# Patient Record
Sex: MALE
[De-identification: names, ages, dates, MRNs, and addresses within clinical notes are randomized per-mention and may not be internally consistent; named-entity substitution may affect disease eponyms.]

---

## 2024-01-24 ENCOUNTER — APPOINTMENT (OUTPATIENT)
Dept: PAIN MANAGEMENT | Facility: CLINIC | Age: 24
End: 2024-01-24
Payer: COMMERCIAL

## 2024-01-24 VITALS — BODY MASS INDEX: 22.22 KG/M2 | WEIGHT: 150 LBS | HEIGHT: 69 IN

## 2024-01-24 PROBLEM — Z00.00 ENCOUNTER FOR PREVENTIVE HEALTH EXAMINATION: Status: ACTIVE | Noted: 2024-01-24

## 2024-01-24 PROCEDURE — 99202 OFFICE O/P NEW SF 15 MIN: CPT

## 2024-01-24 NOTE — REASON FOR VISIT
Recommended OBSERVATION and continued MONITORING for progression. [Initial Visit] : an initial pain management visit

## 2024-01-24 NOTE — ASSESSMENT
[FreeTextEntry1] : History This 24-year-old presents with longstanding left lower back pain.  Patient recently moved up to this area.  He states that he carries a diagnosis of spinal thesis of the lumbar spine and has been dealing with the lower back pain since he was a teenager.  The patient denies any bowel or bladder incontinence any progressive weakness but states that the pain at times becomes problematic.  Patient exercises on a routine basis but states that things have gotten slightly worse recently.  His last evaluation of this issue was approximately 4 years ago with imaging studies and he does not see any providers routinely for this disease state.  The patient has maintained an exercise program that was developed for him by his last provider and on a routine basis is to continue to do these exercises. Objective There are no recent imaging studies lumbar spine.  Patient is able to get to a standing position with minimal difficulty.  He has decreased range of motion of his lumbar spine but normal motion of his lower extremities bilaterally.  Motor and sensory exams appear grossly intact. Assessment Lumbar radiculopathy Plan Patient will continue with the exercise regimen that he has been given.  Will obtain an imaging study of the lumbar spine to determine if there has been further progression of the disease.  Patient will return to the office to discuss results once they have been obtained.

## 2024-01-24 NOTE — HISTORY OF PRESENT ILLNESS
[FreeTextEntry1] : THE PATIENT IS 24 year OLD RT HAND DOMINANT male  WHO PRESENT TODAY IN OFFICE WITH LSPINE RADATING BILATERAL, WOULD LIKE TO DISS PLAN OF CARE OF PAIN.      DATE OF INJURY/ONSET: 2 YEARS THAT PROGRESSING        PAIN: AT REST:_2-3/10       WTIH ACTIVITY: 5-6/10     MECHANISM OF INJURY: NKWN INJ    QUALITY OF SYMPTOMS:  BURNING, PRESSURE      IMPROVES WITH:  BENDING FOWARD, SITTING      WORSE WITH:  STANDING-PRO LONG AND SHORT TIME, HYPEREXTENDING      PRIOR TREATMENT:  PT STATES HAS NOT DONE CT          PRIOR IMAGING:  PT STATES HAS NOTDONE IMAGE

## 2024-03-11 ENCOUNTER — OUTPATIENT (OUTPATIENT)
Dept: OUTPATIENT SERVICES | Facility: HOSPITAL | Age: 24
LOS: 1 days | End: 2024-03-11

## 2024-03-11 ENCOUNTER — APPOINTMENT (OUTPATIENT)
Dept: MRI IMAGING | Facility: CLINIC | Age: 24
End: 2024-03-11
Payer: COMMERCIAL

## 2024-03-11 PROCEDURE — 72148 MRI LUMBAR SPINE W/O DYE: CPT | Mod: 26

## 2024-03-18 ENCOUNTER — APPOINTMENT (OUTPATIENT)
Dept: PAIN MANAGEMENT | Facility: CLINIC | Age: 24
End: 2024-03-18
Payer: COMMERCIAL

## 2024-03-18 DIAGNOSIS — M54.16 RADICULOPATHY, LUMBAR REGION: ICD-10-CM

## 2024-03-18 PROCEDURE — 99213 OFFICE O/P EST LOW 20 MIN: CPT

## 2024-03-18 NOTE — ASSESSMENT
[FreeTextEntry1] : Interim history The patient has had no changes in able to function at the level that he like without significant issue at the present time.  Patient did obtain an MRI of the lumbar spine since the last visit.  He has increased his core strengthening and states that with the core strengthening he is feeling slightly better.  Denies any bowel or bladder incontinence or any progressive weakness. Objective MRI lumbar spine shows significant degenerative changes of the of the lumbar spine consistent with the patient's pain complaint.  Patient is able to get to a standing position while during the Telemedicine.  His functional capabilities seem to be unchanged. Assessment Lumbar radiculopathy Plan Patient will continue to increase his core strengthening to try to protect this area.  He will contact the office if there is any change in his symptoms.

## 2024-03-18 NOTE — HISTORY OF PRESENT ILLNESS
[FreeTextEntry1] : THE PATIENT IS 24 year OLD RT HAND DOMINANT male  WHO PRESENT TODAY IN OFFICE WITH LSPINE RADATING BILATERAL, WOULD LIKE TO DISS PLAN OF CARE OF PAIN.      DATE OF INJURY/ONSET: 2 YEARS THAT PROGRESSING       PAIN LEV: 3/10   MECHANISM OF INJURY: NKWN INJ    QUALITY OF SYMPTOMS:  BURNING, PRESSURE, DULL/ACHE      IMPROVES WITH:  BENDING FOWARD, SITTING      WORSE WITH:  STANDING-PRO LONG AND SHORT TIME, HYPEREXTENDING      PRIOR TREATMENT:  PT STATES HAS NOT DONE CT          PRIOR IMAGING:  PT STATES HAS NOTDONE IMAGE